# Patient Record
Sex: MALE | Race: WHITE | Employment: FULL TIME | URBAN - NONMETROPOLITAN AREA
[De-identification: names, ages, dates, MRNs, and addresses within clinical notes are randomized per-mention and may not be internally consistent; named-entity substitution may affect disease eponyms.]

---

## 2022-12-21 ENCOUNTER — HOSPITAL ENCOUNTER (EMERGENCY)
Age: 27
Discharge: HOME | End: 2022-12-21
Attending: EMERGENCY MEDICINE
Payer: COMMERCIAL

## 2022-12-21 VITALS
HEIGHT: 67 IN | TEMPERATURE: 98 F | RESPIRATION RATE: 16 BRPM | WEIGHT: 210 LBS | BODY MASS INDEX: 32.96 KG/M2 | HEART RATE: 98 BPM | OXYGEN SATURATION: 99 % | SYSTOLIC BLOOD PRESSURE: 129 MMHG | DIASTOLIC BLOOD PRESSURE: 86 MMHG

## 2022-12-21 DIAGNOSIS — H66.001 NON-RECURRENT ACUTE SUPPURATIVE OTITIS MEDIA OF RIGHT EAR WITHOUT SPONTANEOUS RUPTURE OF TYMPANIC MEMBRANE: Primary | ICD-10-CM

## 2022-12-21 PROCEDURE — 74011250637 HC RX REV CODE- 250/637: Performed by: EMERGENCY MEDICINE

## 2022-12-21 PROCEDURE — 99283 EMERGENCY DEPT VISIT LOW MDM: CPT

## 2022-12-21 RX ORDER — IBUPROFEN 600 MG/1
600 TABLET ORAL
Status: COMPLETED | OUTPATIENT
Start: 2022-12-21 | End: 2022-12-21

## 2022-12-21 RX ORDER — AMOXICILLIN AND CLAVULANATE POTASSIUM 875; 125 MG/1; MG/1
1 TABLET, FILM COATED ORAL 2 TIMES DAILY
Qty: 20 TABLET | Refills: 0 | Status: SHIPPED | OUTPATIENT
Start: 2022-12-21 | End: 2022-12-31

## 2022-12-21 RX ORDER — AMOXICILLIN AND CLAVULANATE POTASSIUM 875; 125 MG/1; MG/1
1 TABLET, FILM COATED ORAL
Status: COMPLETED | OUTPATIENT
Start: 2022-12-21 | End: 2022-12-21

## 2022-12-21 RX ADMIN — IBUPROFEN 600 MG: 600 TABLET, FILM COATED ORAL at 23:44

## 2022-12-21 RX ADMIN — AMOXICILLIN AND CLAVULANATE POTASSIUM 1 TABLET: 875; 125 TABLET, FILM COATED ORAL at 23:45

## 2022-12-22 NOTE — ED PROVIDER NOTES
This is a 58-year-old male comes emergency room with a chief complaint of right ear pain. Patient states he has been sick the past few days with cough and cold-like symptoms. Patient states that about 2 to 3 hours ago he started having pain in his right ear. Patient states that it feels like it swelling up on the inside. Patient denies any bleeding or drainage from the ear. Patient denies any fever or chills. Patient states he has had a significant decrease in hearing out of his right ear since the onset. Patient states he took some Tylenol without much relief. Patient denies any sore throat. Patient denies any cough currently. Patient denies any chest pain or shortness of breath. The history is provided by the patient. No  was used. Ear Fullness   This is a new problem. The current episode started 3 to 5 hours ago. The problem occurs constantly. The problem has been gradually worsening. Patient complains that the right ear is affected. There has been no fever. The pain is moderate. Associated symptoms include hearing loss. Pertinent negatives include no ear discharge, no rhinorrhea, no sore throat, no abdominal pain, no vomiting, no cough and no rash. The risk factors include recent URI. His past medical history does not include chronic ear infection, hearing loss or tympanostomy tube. History reviewed. No pertinent past medical history. No past surgical history on file. History reviewed. No pertinent family history.     Social History     Socioeconomic History    Marital status: SINGLE     Spouse name: Not on file    Number of children: Not on file    Years of education: Not on file    Highest education level: Not on file   Occupational History    Not on file   Tobacco Use    Smoking status: Not on file    Smokeless tobacco: Not on file   Substance and Sexual Activity    Alcohol use: Not on file    Drug use: Not on file    Sexual activity: Not on file   Other Topics Concern    Not on file   Social History Narrative    Not on file     Social Determinants of Health     Financial Resource Strain: Not on file   Food Insecurity: Not on file   Transportation Needs: Not on file   Physical Activity: Not on file   Stress: Not on file   Social Connections: Not on file   Intimate Partner Violence: Not on file   Housing Stability: Not on file     ALLERGIES: Patient has no known allergies. Review of Systems   Constitutional:  Negative for appetite change, chills, fever and unexpected weight change. HENT:  Positive for ear pain and hearing loss. Negative for ear discharge, rhinorrhea, sore throat and trouble swallowing. Eyes:  Negative for pain and visual disturbance. Respiratory:  Negative for cough, chest tightness and shortness of breath. Cardiovascular:  Negative for chest pain and palpitations. Gastrointestinal:  Negative for abdominal distention, abdominal pain, blood in stool and vomiting. Genitourinary:  Negative for dysuria, hematuria and urgency. Musculoskeletal:  Negative for back pain and myalgias. Skin:  Negative for rash. Neurological:  Negative for dizziness, syncope, weakness and numbness. Psychiatric/Behavioral:  Negative for confusion and suicidal ideas. All other systems reviewed and are negative. Vitals:    12/21/22 2321   BP: 129/86   Pulse: 98   Resp: 16   Temp: 98 °F (36.7 °C)   SpO2: 99%   Weight: 95.3 kg (210 lb)   Height: 5' 7\" (1.702 m)            Physical Exam  Vitals and nursing note reviewed. Constitutional:       General: He is not in acute distress. Appearance: He is well-developed. He is not diaphoretic. HENT:      Head: Normocephalic and atraumatic. Right Ear: External ear normal. Decreased hearing noted. No mastoid tenderness. Tympanic membrane is injected, erythematous and bulging. Tympanic membrane is not perforated. Tympanic membrane has decreased mobility.       Left Ear: External ear normal. No mastoid tenderness. Tympanic membrane is retracted. Tympanic membrane is not injected, perforated, erythematous or bulging. Tympanic membrane has decreased mobility. Nose: Nose normal.      Mouth/Throat:      Mouth: Mucous membranes are moist.      Pharynx: No oropharyngeal exudate or posterior oropharyngeal erythema. Eyes:      General: No scleral icterus. Right eye: No discharge. Left eye: No discharge. Extraocular Movements: Extraocular movements intact. Conjunctiva/sclera: Conjunctivae normal.      Pupils: Pupils are equal, round, and reactive to light. Neck:      Vascular: No JVD. Trachea: No tracheal deviation. Cardiovascular:      Rate and Rhythm: Normal rate and regular rhythm. Heart sounds: Normal heart sounds. No murmur heard. No friction rub. No gallop. Pulmonary:      Effort: Pulmonary effort is normal. No respiratory distress. Breath sounds: Normal breath sounds. No stridor. No decreased breath sounds, wheezing, rhonchi or rales. Chest:      Chest wall: No tenderness. Musculoskeletal:      Cervical back: Normal range of motion and neck supple. Skin:     General: Skin is warm and dry. Capillary Refill: Capillary refill takes less than 2 seconds. Coloration: Skin is not pale. Findings: No erythema or rash. Neurological:      General: No focal deficit present. Mental Status: He is alert and oriented to person, place, and time. GCS: GCS eye subscore is 4. GCS verbal subscore is 5. GCS motor subscore is 6. Cranial Nerves: No cranial nerve deficit. Sensory: No sensory deficit. Motor: No abnormal muscle tone. Coordination: Coordination normal.      Deep Tendon Reflexes: Reflexes are normal and symmetric. Reflexes normal.   Psychiatric:         Mood and Affect: Mood normal.         Behavior: Behavior normal.         Thought Content:  Thought content normal.         Judgment: Judgment normal.        MDM  Risk of Complications, Morbidity, and/or Mortality  Presenting problems: moderate  Diagnostic procedures: low  Management options: low    Patient Progress  Patient progress: stable       Procedures    Chief Complaint   Patient presents with    Ear Fullness       The patient's presenting problems have been discussed, and they are in agreement with the care plan formulated and outlined with them. I have encouraged them to ask questions as they arise throughout their visit. MEDICATIONS GIVEN:  Medications   amoxicillin-clavulanate (AUGMENTIN) 875-125 mg per tablet 1 Tablet (has no administration in time range)   ibuprofen (MOTRIN) tablet 600 mg (has no administration in time range)       LABS REVIEWED:  No results found for this or any previous visit (from the past 24 hour(s)). VITAL SIGNS:  Patient Vitals for the past 24 hrs:   Temp Pulse Resp BP SpO2   12/21/22 2321 98 °F (36.7 °C) 98 16 129/86 99 %       PROGRESS NOTES:  Discussed results and plan with patient. Patient be placed on antibiotics for 1 week. Patient instructed on continued Tylenol and add anti-inflammatory such as ibuprofen. Patient instructed to keep the right ear dry is much as possible. Patient instructed follow-up with PCP in 1 week or so for reevaluation. Patient instructed to finish course of antibiotics and return the emergency room for worsening symptoms or any other concerns.     DIAGNOSIS:    1. Non-recurrent acute suppurative otitis media of right ear without spontaneous rupture of tympanic membrane        PLAN:  Follow-up Information       Follow up With Specialties Details Why Contact Info    Your primary doctor  In 1 week      [facility] EMERGENCY DEPT Emergency Medicine  If symptoms worsen [address] [id number] [phone]          Current Discharge Medication List        START taking these medications    Details   amoxicillin-clavulanate (Augmentin) 875-125 mg per tablet Take 1 Tablet by mouth two (2) times a day for 10 days. Qty: 20 Tablet, Refills: 0  Start date: 12/21/2022, End date: 12/31/2022             ED COURSE: The patient's hospital course has been uncomplicated. Please note that this dictation was completed with [employer], the computer voice recognition software. Quite often unanticipated grammatical, syntax, homophones, and other interpretive errors are inadvertently transcribed by the computer software. Please disregard these errors. Please excuse any errors that have escaped final proofreading.

## 2022-12-22 NOTE — DISCHARGE INSTRUCTIONS
You were seen and evaluated for your right ear pain. You have an infection in your right ear. Continue Tylenol and ibuprofen for pain relief. You are placed on antibiotic for 1 week. Please finish the round of antibiotics. Follow-up with your PCP. Return the emergency room for worsening symptoms or any other concerns. It will take a few days for your ear pain and hearing to improve.